# Patient Record
Sex: FEMALE | Race: OTHER | URBAN - METROPOLITAN AREA
[De-identification: names, ages, dates, MRNs, and addresses within clinical notes are randomized per-mention and may not be internally consistent; named-entity substitution may affect disease eponyms.]

---

## 2018-04-06 ENCOUNTER — HOSPITAL ENCOUNTER (OUTPATIENT)
Dept: OTHER | Age: 23
Discharge: HOME | End: 2018-04-06
Attending: PHYSICIAN ASSISTANT | Admitting: PHYSICIAN ASSISTANT

## 2018-04-06 LAB — GONADOTROPIN, CHORIONIC (HCG) QUANT: 266 MIU/ML

## 2018-04-11 LAB
ABO/RH: NORMAL
ANTIBODY SCREEN: NORMAL
GONADOTROPIN, CHORIONIC (HCG) QUANT: 1741 MIU/ML
HEPATITIS C ANTIBODY INTERPRETATION: NORMAL

## 2018-04-12 LAB
BASOPHILS ABSOLUTE: 0 K/UL (ref 0–0.2)
BASOPHILS RELATIVE PERCENT: 0.3 %
EOSINOPHILS ABSOLUTE: 0.1 K/UL (ref 0–0.6)
EOSINOPHILS RELATIVE PERCENT: 0.9 %
HCT VFR BLD CALC: 42.1 % (ref 36–48)
HEMOGLOBIN: 13.9 G/DL (ref 12–16)
HEPATITIS B SURFACE ANTIGEN INTERPRETATION: ABNORMAL
HIV AG/AB: NORMAL
LYMPHOCYTES ABSOLUTE: 3.2 K/UL (ref 1–5.1)
LYMPHOCYTES RELATIVE PERCENT: 28.8 %
MCH RBC QN AUTO: 28.1 PG (ref 26–34)
MCHC RBC AUTO-ENTMCNC: 33 G/DL (ref 31–36)
MCV RBC AUTO: 85.1 FL (ref 80–100)
MONOCYTES ABSOLUTE: 0.7 K/UL (ref 0–1.3)
MONOCYTES RELATIVE PERCENT: 6.5 %
NEUTROPHILS ABSOLUTE: 7 K/UL (ref 1.7–7.7)
NEUTROPHILS RELATIVE PERCENT: 63.5 %
PDW BLD-RTO: 12.8 % (ref 12.4–15.4)
PLATELET # BLD: 363 K/UL (ref 135–450)
PMV BLD AUTO: 9 FL (ref 5–10.5)
RBC # BLD: 4.95 M/UL (ref 4–5.2)
RPR: ABNORMAL
RUBELLA ANTIBODY IGG: 439.8 IU/ML
WBC # BLD: 11.1 K/UL (ref 4–11)

## 2018-04-17 LAB — GLUCOSE CHALLENGE: 193 MG/DL

## 2018-04-18 LAB
ESTIMATED AVERAGE GLUCOSE: 131.2 MG/DL
HBA1C MFR BLD: 6.2 %

## 2018-04-23 ENCOUNTER — HOSPITAL ENCOUNTER (OUTPATIENT)
Dept: OTHER | Age: 23
Discharge: HOME | End: 2018-04-23
Attending: OBSTETRICS & GYNECOLOGY | Admitting: OBSTETRICS & GYNECOLOGY

## 2018-05-01 ASSESSMENT — PATIENT HEALTH QUESTIONNAIRE - PHQ9
2. FEELING DOWN, DEPRESSED OR HOPELESS: 1
SUM OF ALL RESPONSES TO PHQ9 QUESTIONS 1 & 2: 2
1. LITTLE INTEREST OR PLEASURE IN DOING THINGS: 1
SUM OF ALL RESPONSES TO PHQ QUESTIONS 1-9: 2

## 2018-05-15 ENCOUNTER — HOSPITAL ENCOUNTER (OUTPATIENT)
Dept: DIABETES SERVICES | Age: 23
Discharge: HOME | End: 2018-05-31
Admitting: OBSTETRICS & GYNECOLOGY

## 2018-05-15 DIAGNOSIS — O99.810 ABNORMAL GLUCOSE COMPLICATING PREGNANCY: ICD-10-CM

## 2018-08-20 ENCOUNTER — HOSPITAL ENCOUNTER (OUTPATIENT)
Dept: OTHER | Age: 23
Discharge: HOME | End: 2018-08-20
Attending: OBSTETRICS & GYNECOLOGY | Admitting: OBSTETRICS & GYNECOLOGY

## 2018-08-20 LAB
HCT VFR BLD CALC: 37.4 % (ref 36–48)
HEMOGLOBIN: 12.4 G/DL (ref 12–16)
MCH RBC QN AUTO: 27.6 PG (ref 26–34)
MCHC RBC AUTO-ENTMCNC: 33.1 G/DL (ref 31–36)
MCV RBC AUTO: 83.2 FL (ref 80–100)
PDW BLD-RTO: 13.3 % (ref 12.4–15.4)
PLATELET # BLD: 298 K/UL (ref 135–450)
PMV BLD AUTO: 9.2 FL (ref 5–10.5)
RBC # BLD: 4.49 M/UL (ref 4–5.2)
WBC # BLD: 10.5 K/UL (ref 4–11)

## 2018-09-26 ENCOUNTER — APPOINTMENT (OUTPATIENT)
Dept: ULTRASOUND IMAGING | Age: 23
DRG: 781 | End: 2018-09-26
Payer: COMMERCIAL

## 2018-09-26 ENCOUNTER — APPOINTMENT (OUTPATIENT)
Dept: MRI IMAGING | Age: 23
DRG: 781 | End: 2018-09-26
Payer: COMMERCIAL

## 2018-09-26 ENCOUNTER — HOSPITAL ENCOUNTER (INPATIENT)
Age: 23
LOS: 1 days | Discharge: HOME | DRG: 781 | End: 2018-09-27
Attending: OBSTETRICS & GYNECOLOGY | Admitting: OBSTETRICS & GYNECOLOGY
Payer: COMMERCIAL

## 2018-09-26 PROBLEM — M79.18 ABDOMINAL MUSCLE PAIN: Status: ACTIVE | Noted: 2018-09-26

## 2018-09-26 LAB
BACTERIA: ABNORMAL /HPF
BASOPHILS ABSOLUTE: 0.1 K/UL (ref 0–0.2)
BASOPHILS RELATIVE PERCENT: 0.4 %
BILIRUBIN URINE: ABNORMAL
BLOOD, URINE: NEGATIVE
CLARITY: ABNORMAL
COLOR: ABNORMAL
EOSINOPHILS ABSOLUTE: 0 K/UL (ref 0–0.6)
EOSINOPHILS RELATIVE PERCENT: 0.1 %
EPITHELIAL CELLS, UA: 10 /HPF (ref 0–5)
GLUCOSE BLD-MCNC: 89 MG/DL (ref 70–99)
GLUCOSE URINE: NEGATIVE MG/DL
HCT VFR BLD CALC: 36.5 % (ref 36–48)
HEMOGLOBIN: 12.1 G/DL (ref 12–16)
KETONES, URINE: ABNORMAL MG/DL
LEUKOCYTE ESTERASE, URINE: ABNORMAL
LYMPHOCYTES ABSOLUTE: 1.5 K/UL (ref 1–5.1)
LYMPHOCYTES RELATIVE PERCENT: 8.7 %
MCH RBC QN AUTO: 26.5 PG (ref 26–34)
MCHC RBC AUTO-ENTMCNC: 33.3 G/DL (ref 31–36)
MCV RBC AUTO: 79.8 FL (ref 80–100)
MICROSCOPIC EXAMINATION: YES
MONOCYTES ABSOLUTE: 0.8 K/UL (ref 0–1.3)
MONOCYTES RELATIVE PERCENT: 4.9 %
NEUTROPHILS ABSOLUTE: 14.7 K/UL (ref 1.7–7.7)
NEUTROPHILS RELATIVE PERCENT: 85.9 %
NITRITE, URINE: NEGATIVE
PDW BLD-RTO: 13.5 % (ref 12.4–15.4)
PERFORMED ON: NORMAL
PH UA: 6
PLATELET # BLD: 296 K/UL (ref 135–450)
PMV BLD AUTO: 8.7 FL (ref 5–10.5)
PROTEIN UA: 30 MG/DL
RBC # BLD: 4.58 M/UL (ref 4–5.2)
RBC UA: 6 /HPF (ref 0–4)
SPECIFIC GRAVITY UA: 1.03
URINE TYPE: ABNORMAL
UROBILINOGEN, URINE: 1 E.U./DL
WBC # BLD: 17.1 K/UL (ref 4–11)
WBC UA: 11 /HPF (ref 0–5)

## 2018-09-26 PROCEDURE — 74181 MRI ABDOMEN W/O CONTRAST: CPT

## 2018-09-26 PROCEDURE — 85025 COMPLETE CBC W/AUTO DIFF WBC: CPT

## 2018-09-26 PROCEDURE — 81001 URINALYSIS AUTO W/SCOPE: CPT

## 2018-09-26 PROCEDURE — 99222 1ST HOSP IP/OBS MODERATE 55: CPT | Performed by: SURGERY

## 2018-09-26 PROCEDURE — APPSS60 APP SPLIT SHARED TIME 46-60 MINUTES: Performed by: NURSE PRACTITIONER

## 2018-09-26 PROCEDURE — 76805 OB US >/= 14 WKS SNGL FETUS: CPT

## 2018-09-26 PROCEDURE — 6370000000 HC RX 637 (ALT 250 FOR IP): Performed by: OBSTETRICS & GYNECOLOGY

## 2018-09-26 PROCEDURE — 76705 ECHO EXAM OF ABDOMEN: CPT

## 2018-09-26 PROCEDURE — 1220000000 HC SEMI PRIVATE OB R&B

## 2018-09-26 PROCEDURE — 2580000003 HC RX 258

## 2018-09-26 PROCEDURE — APPNB30 APP NON BILLABLE TIME 0-30 MINS: Performed by: NURSE PRACTITIONER

## 2018-09-26 RX ORDER — ACETAMINOPHEN 325 MG/1
650 TABLET ORAL ONCE
Status: COMPLETED | OUTPATIENT
Start: 2018-09-26 | End: 2018-09-26

## 2018-09-26 RX ORDER — SODIUM CHLORIDE 9 MG/ML
INJECTION, SOLUTION INTRAVENOUS CONTINUOUS
Status: DISCONTINUED | OUTPATIENT
Start: 2018-09-26 | End: 2018-09-27

## 2018-09-26 RX ORDER — ACETAMINOPHEN 325 MG/1
650 TABLET ORAL EVERY 4 HOURS PRN
Status: DISCONTINUED | OUTPATIENT
Start: 2018-09-26 | End: 2018-09-27 | Stop reason: HOSPADM

## 2018-09-26 RX ORDER — ACETAMINOPHEN AND CODEINE PHOSPHATE 300; 30 MG/1; MG/1
1 TABLET ORAL EVERY 4 HOURS PRN
Status: DISCONTINUED | OUTPATIENT
Start: 2018-09-26 | End: 2018-09-27 | Stop reason: HOSPADM

## 2018-09-26 RX ORDER — SODIUM CHLORIDE 9 MG/ML
INJECTION, SOLUTION INTRAVENOUS
Status: DISPENSED
Start: 2018-09-26 | End: 2018-09-27

## 2018-09-26 RX ADMIN — ACETAMINOPHEN AND CODEINE PHOSPHATE 1 TABLET: 300; 30 TABLET ORAL at 23:18

## 2018-09-26 RX ADMIN — ACETAMINOPHEN 650 MG: 325 TABLET, FILM COATED ORAL at 13:11

## 2018-09-26 ASSESSMENT — PAIN SCALES - GENERAL
PAINLEVEL_OUTOF10: 8
PAINLEVEL_OUTOF10: 8
PAINLEVEL_OUTOF10: 6

## 2018-09-26 ASSESSMENT — PAIN DESCRIPTION - FREQUENCY: FREQUENCY: INTERMITTENT

## 2018-09-26 ASSESSMENT — PAIN DESCRIPTION - DESCRIPTORS: DESCRIPTORS: SHARP

## 2018-09-26 ASSESSMENT — PAIN DESCRIPTION - LOCATION: LOCATION: ABDOMEN

## 2018-09-26 ASSESSMENT — PAIN DESCRIPTION - PAIN TYPE: TYPE: ACUTE PAIN

## 2018-09-26 ASSESSMENT — PAIN DESCRIPTION - ORIENTATION: ORIENTATION: RIGHT

## 2018-09-26 NOTE — PROGRESS NOTES
[facility]        Assessment & Plan of Care    Patient Name: Tyler Barros  MRN: [id number]  YOB: 1995  Admission Date: 9/26/2018  9:10 AM   Date of Evaluation: 9/26/2018  Primary Care Physician: Jeremy Cabrera MD  Reason for Consult: Cholelithiasis and RLQ abdominal pain    SUBJECTIVE    History of Present Illness:  Ms. Heather Morse is a 26 y.o. female who presents with right upper and right lower quadrant abdominal pain that started this morning upon waking. She describes it as severe, sharp, constant and reports that movement worsens the pain but has eased up some spontaneously since admission. Associated symptoms include mild cramping pain in the left lower quadrant, nausea, vomiting, and diarrhea 2 days ago. She reports that she experienced nausea/vomiting, along with diarrhea 2 days ago but no pain at that time, and her symptoms completely subsided yesterday. She denies fevers/chills, sweats, decrease in appetite, hematochezia/melena, hematemesis or bilious emesis, new foods, dysuria/hematuria, or similar symptoms in the past.  Her last BM was this morning and reportedly normal.  She has not had anything to eat today. Denies any prior surgeries. She is currently 29 weeks pregnant and has gestational diabetes. She does not take any blood thinners, smoke, or use illicit drugs or alcohol. No allergies. RUQ ultrasound today revealed cholelithiasis. Her WBC was 17,100 upon admission but she was afebrile.       OBJECTIVE    Physical Exam:  CONSTITUTIONAL:  alert, no apparent distress and moderately obese  NEUROLOGIC:  Mental Status Exam:  Level of Alertness:   awake  Orientation:   person, place, time  EYES:  sclera clear  ENT:  normocepalic, without obvious abnormality  NECK:  supple, symmetrical, trachea midline  LUNGS:  clear to auscultation  CARDIOVASCULAR:  regular rate and rhythm and no murmur noted  ABDOMEN: soft, non-distended (29

## 2018-09-26 NOTE — CONSULTS
no known allergies.     Social History:   Social History     Social History    Marital status: Single     Spouse name: N/A    Number of children: N/A    Years of education: N/A     Social History Main Topics    Smoking status: Never Smoker    Smokeless tobacco: Never Used    Alcohol use No    Drug use: No    Sexual activity: Yes     Partners: Male     Other Topics Concern    None     Social History Narrative    None       Family History:    Family History   Problem Relation Age of Onset    Diabetes Mother     Diabetes Father        Review of Systems:  CONSTITUTIONAL:  Negative except as above  HEENT:  negative  RESPIRATORY:  negative  CARDIOVASCULAR:  negative  GASTROINTESTINAL:  negative except as above   GENITOURINARY:  negative  HEMATOLOGIC/LYMPHATIC:  negative  NEUROLOGICAL:  Negative      Vital Signs:  Patient Vitals for the past 24 hrs:   BP Temp Temp src Pulse Resp SpO2 Height Weight   18 1730 114/71 98.2 °F (36.8 °C) Oral 83 24 98 % - -   18 1422 - - - - - - 5' 2\" (1.575 m) 202 lb (91.6 kg)   18 0925 - 97.4 °F (36.3 °C) Temporal - 22 - - -      TEMPERATURE HISTORY 24H: Temp (24hrs), Av.8 °F (36.6 °C), Min:97.4 °F (36.3 °C), Max:98.2 °F (36.8 °C)    BLOOD PRESSURE HISTORY: Systolic (18SNG), HAC:724 , Min:114 , LOD:357    Diastolic (71TYH), ZCS:34, Min:71, Max:71    Admission Weight: 202 lb (91.6 kg)       Intake/Output Summary (Last 24 hours) at 18 1813  Last data filed at 18 1806   Gross per 24 hour   Intake                0 ml   Output              125 ml   Net             -125 ml     Drain/tube Output:         Physical Exam:  CONSTITUTIONAL:  alert, no apparent distress   NEUROLOGIC:  Mental Status Exam:  Level of Alertness:   awake  Orientation:  Oriented to person, place, time  EYES:  sclera clear  HENT:  normocepalic, without obvious abnormality  NECK:  supple, symmetrical, trachea midline  LUNGS:  clear to auscultation  CARDIOVASCULAR:  regular rate and rhythm   ABDOMEN: soft, non-distended but appropriate gestational size, mild right upper quadrant tenderness to deep palpation without peritonitis  SKIN:  no bruising or bleeding, normal skin color, texture, turgor and no redness, warmth, or swelling      Labs:    CBC:    Recent Labs      09/26/18   1010   WBC  17.1*   HGB  12.1   HCT  36.5   PLT  296     BMP:  No results for input(s): NA, K, CL, CO2, BUN, CREATININE, GLUCOSE in the last 72 hours. Hepatic: No results for input(s): AST, ALT, ALB, BILITOT, ALKPHOS in the last 72 hours. Amylase: No results for input(s): AMYLASE in the last 72 hours. Lipase: No results for input(s): LIPASE in the last 72 hours. Mag:    No results for input(s): MG in the last 72 hours. Phos:   No results for input(s): PHOS in the last 72 hours. Coags: No results for input(s): INR, APTT in the last 72 hours. Imaging:  I have personally reviewed the following films:    Us Gallbladder Ruq    Result Date: 9/26/2018  EXAMINATION: RIGHT UPPER QUADRANT ULTRASOUND 9/26/2018 12:15 pm COMPARISON: None. HISTORY: ORDERING SYSTEM PROVIDED HISTORY: ABDOMINAL PAIN FINDINGS: LIVER:  The liver exhibits diffuse increased echogenicity consistent with fatty infiltration. There is focal fatty sparing noted within the region of the marixa hepatis. BILIARY SYSTEM:  The gallbladder contains numerous shadowing echogenic gallstones. There is no wall thickening or pericholecystic fluid. Common bile duct is within normal limits measuring 5 mm. RIGHT KIDNEY: The right kidney is grossly unremarkable without evidence of hydronephrosis. PANCREAS:  Visualized portions of the pancreas are unremarkable. OTHER: No evidence of right upper quadrant ascites. 1. Cholelithiasis. 2. Fatty infiltration of the liver with focal sparing. Xr Chest Portable    Result Date: 9/1/2018  EXAMINATION: SINGLE XRAY VIEW OF THE CHEST 9/1/2018 9:28 am COMPARISON: None.  HISTORY: ORDERING PHYSICIAN PROVIDED HISTORY: INGRID visualization. Velocities are measured in cm/s ; Diameters are measured in mm Right Lower Extremities DVT Study Measurements Right 2D Measurements +------------------------+----------+---------------+----------+ ! Location                ! Visualized! Compressibility! Thrombosis! +------------------------+----------+---------------+----------+ ! Sapheno Femoral Junction! Yes       ! Yes            ! None      ! +------------------------+----------+---------------+----------+ ! GSV Thigh               ! Yes       ! Yes            ! None      ! +------------------------+----------+---------------+----------+ ! Common Femoral          !Yes       ! Yes            ! None      ! +------------------------+----------+---------------+----------+ ! Prox Femoral            !Yes       ! Yes            ! None      ! +------------------------+----------+---------------+----------+ ! Mid Femoral             !Yes       ! Yes            ! None      ! +------------------------+----------+---------------+----------+ ! Dist Femoral            !Yes       ! Yes            ! None      ! +------------------------+----------+---------------+----------+ ! Deep Femoral            !Yes       ! Yes            ! None      ! +------------------------+----------+---------------+----------+ ! Popliteal               !Yes       ! Yes            ! None      ! +------------------------+----------+---------------+----------+ ! GSV Below Knee          ! Yes       ! Yes            ! None      ! +------------------------+----------+---------------+----------+ ! Gastroc                 ! Yes       ! Yes            ! None      ! +------------------------+----------+---------------+----------+ ! Soleal                  !Yes       ! Yes            ! None      ! +------------------------+----------+---------------+----------+ ! PTV                     ! Yes       ! Yes            ! None      ! +------------------------+----------+---------------+----------+ ! Tito                !Yes       ! Yes !Yes            !None      ! +------------------------+----------+---------------+----------+ ! Dist Femoral            !Yes       ! Yes            ! None      ! +------------------------+----------+---------------+----------+ ! Deep Femoral            !Yes       ! Yes            ! None      ! +------------------------+----------+---------------+----------+ ! Popliteal               !Yes       ! Yes            ! None      ! +------------------------+----------+---------------+----------+ ! GSV Below Knee          ! Yes       ! Yes            ! None      ! +------------------------+----------+---------------+----------+ ! Gastroc                 ! Yes       ! Yes            ! None      ! +------------------------+----------+---------------+----------+ ! Soleal                  !Yes       ! Yes            ! None      ! +------------------------+----------+---------------+----------+ ! PTV                     ! Yes       ! Yes            ! None      ! +------------------------+----------+---------------+----------+ ! Peroneal                !Yes       ! Yes            ! None      ! +------------------------+----------+---------------+----------+ ! GSV Calf                ! Yes       ! Yes            ! None      ! +------------------------+----------+---------------+----------+ ! SSV                     ! Yes       ! Yes            ! None      ! +------------------------+----------+---------------+----------+ Left Doppler Measurements +------------------------+-----------+------+------------+ ! Location                ! Signal     !Reflux! Reflux (sec)! +------------------------+-----------+------+------------+ ! Sapheno Femoral Junction! Phasic     !      !            ! +------------------------+-----------+------+------------+ ! Common Femoral          !Phasic     !      !            ! +------------------------+-----------+------+------------+ ! Prox Femoral            !Phasic     !      !            ! consider re-imaging  2. Although has cholelithiasis, doubt symptomatic biliary colic. Doubt cholecystitis but will monitor. She does have history of post prandial discomfort but may be gestational dyspepsia or biliary colic, but her current symptoms are clearly different than prior symptoms. 3. Right hydronephrosis of unclear etiology, if symptoms do not improve may need urology consultation for evaluation  4. Patient feels better and hungry, will trial clear liquids  5. Pain medication and antiemetics as needed as may mask worsening exam and cloud clinical picture  6. The patient is not currently smoking. Recommend maintaining a smoke-free lifestyle. 7. DVT prophylaxis with SCD's    This plan was discussed at length with the patient. She was understanding and in agreement with the plan  Thank you for the consult and allowing me to participate in the care of this patient. I look forward to following her this admission    Ovidio Shi MD, FACS  9/26/2018  6:13 PM

## 2018-09-27 VITALS
BODY MASS INDEX: 37.17 KG/M2 | DIASTOLIC BLOOD PRESSURE: 72 MMHG | HEIGHT: 62 IN | WEIGHT: 202 LBS | RESPIRATION RATE: 20 BRPM | HEART RATE: 77 BPM | TEMPERATURE: 98 F | OXYGEN SATURATION: 98 % | SYSTOLIC BLOOD PRESSURE: 109 MMHG

## 2018-09-27 LAB
ALBUMIN SERPL-MCNC: 3.1 G/DL (ref 3.4–5)
ALP BLD-CCNC: 115 U/L (ref 40–129)
ALT SERPL-CCNC: 13 U/L (ref 10–40)
ANION GAP SERPL CALCULATED.3IONS-SCNC: 13 MMOL/L (ref 3–16)
AST SERPL-CCNC: 14 U/L (ref 15–37)
BASOPHILS ABSOLUTE: 0 K/UL (ref 0–0.2)
BASOPHILS RELATIVE PERCENT: 0.5 %
BILIRUB SERPL-MCNC: 0.4 MG/DL (ref 0–1)
BILIRUBIN DIRECT: <0.2 MG/DL (ref 0–0.3)
BILIRUBIN, INDIRECT: ABNORMAL MG/DL (ref 0–1)
BUN BLDV-MCNC: 5 MG/DL (ref 7–20)
CALCIUM SERPL-MCNC: 9 MG/DL (ref 8.3–10.6)
CHLORIDE BLD-SCNC: 103 MMOL/L (ref 99–110)
CO2: 21 MMOL/L (ref 21–32)
CREAT SERPL-MCNC: <0.5 MG/DL (ref 0.6–1.1)
EOSINOPHILS ABSOLUTE: 0.1 K/UL (ref 0–0.6)
EOSINOPHILS RELATIVE PERCENT: 1.6 %
ESTIMATED AVERAGE GLUCOSE: 111.2 MG/DL
GFR AFRICAN AMERICAN: >60
GFR NON-AFRICAN AMERICAN: >60
GLUCOSE BLD-MCNC: 121 MG/DL (ref 70–99)
GLUCOSE BLD-MCNC: 94 MG/DL (ref 70–99)
HBA1C MFR BLD: 5.5 %
HCT VFR BLD CALC: 33.9 % (ref 36–48)
HEMOGLOBIN: 10.9 G/DL (ref 12–16)
LIPASE: 13 U/L (ref 13–60)
LYMPHOCYTES ABSOLUTE: 2.9 K/UL (ref 1–5.1)
LYMPHOCYTES RELATIVE PERCENT: 34.3 %
MCH RBC QN AUTO: 25.8 PG (ref 26–34)
MCHC RBC AUTO-ENTMCNC: 32.2 G/DL (ref 31–36)
MCV RBC AUTO: 80.2 FL (ref 80–100)
MONOCYTES ABSOLUTE: 0.7 K/UL (ref 0–1.3)
MONOCYTES RELATIVE PERCENT: 8.6 %
NEUTROPHILS ABSOLUTE: 4.7 K/UL (ref 1.7–7.7)
NEUTROPHILS RELATIVE PERCENT: 55 %
PDW BLD-RTO: 13.7 % (ref 12.4–15.4)
PERFORMED ON: ABNORMAL
PLATELET # BLD: 259 K/UL (ref 135–450)
PMV BLD AUTO: 8.3 FL (ref 5–10.5)
POTASSIUM SERPL-SCNC: 3.5 MMOL/L (ref 3.5–5.1)
RBC # BLD: 4.23 M/UL (ref 4–5.2)
SODIUM BLD-SCNC: 137 MMOL/L (ref 136–145)
TOTAL PROTEIN: 6.5 G/DL (ref 6.4–8.2)
WBC # BLD: 8.5 K/UL (ref 4–11)

## 2018-09-27 PROCEDURE — 83036 HEMOGLOBIN GLYCOSYLATED A1C: CPT

## 2018-09-27 PROCEDURE — 80048 BASIC METABOLIC PNL TOTAL CA: CPT

## 2018-09-27 PROCEDURE — 6360000002 HC RX W HCPCS: Performed by: OBSTETRICS & GYNECOLOGY

## 2018-09-27 PROCEDURE — 80076 HEPATIC FUNCTION PANEL: CPT

## 2018-09-27 PROCEDURE — APPNB30 APP NON BILLABLE TIME 0-30 MINS: Performed by: NURSE PRACTITIONER

## 2018-09-27 PROCEDURE — 2580000003 HC RX 258: Performed by: NURSE PRACTITIONER

## 2018-09-27 PROCEDURE — 85025 COMPLETE CBC W/AUTO DIFF WBC: CPT

## 2018-09-27 PROCEDURE — G0008 ADMIN INFLUENZA VIRUS VAC: HCPCS | Performed by: OBSTETRICS & GYNECOLOGY

## 2018-09-27 PROCEDURE — 83690 ASSAY OF LIPASE: CPT

## 2018-09-27 PROCEDURE — 99231 SBSQ HOSP IP/OBS SF/LOW 25: CPT | Performed by: SURGERY

## 2018-09-27 PROCEDURE — 90686 IIV4 VACC NO PRSV 0.5 ML IM: CPT | Performed by: OBSTETRICS & GYNECOLOGY

## 2018-09-27 PROCEDURE — APPSS15 APP SPLIT SHARED TIME 0-15 MINUTES: Performed by: NURSE PRACTITIONER

## 2018-09-27 RX ADMIN — INFLUENZA A VIRUS A/MICHIGAN/45/2015 X-275 (H1N1) ANTIGEN (FORMALDEHYDE INACTIVATED), INFLUENZA A VIRUS A/SINGAPORE/INFIMH-16-0019/2016 IVR-186 (H3N2) ANTIGEN (FORMALDEHYDE INACTIVATED), INFLUENZA B VIRUS B/PHUKET/3073/2013 ANTIGEN (FORMALDEHYDE INACTIVATED), AND INFLUENZA B VIRUS B/MARYLAND/15/2016 BX-69A ANTIGEN (FORMALDEHYDE INACTIVATED) 0.5 ML: 15; 15; 15; 15 INJECTION, SUSPENSION INTRAMUSCULAR at 13:20

## 2018-09-27 RX ADMIN — SODIUM CHLORIDE: 9 INJECTION, SOLUTION INTRAVENOUS at 03:47

## 2018-09-27 NOTE — PROGRESS NOTES
[facility]        Progress Note    Patient Name: Marty Zaidi  MRN: [id number]  YOB: 1995  Date of Evaluation: 2018    Chief Complaint: Abdominal pain    Subjective:  No acute events overnight  Sitting at bedside  Pain minimal this morning  No nausea or vomiting with clear liquids, wanting regular food      Vital Signs:  Patient Vitals for the past 24 hrs:   BP Temp Temp src Pulse Resp SpO2 Height Weight   18 0635 108/71 97.5 °F (36.4 °C) Oral 79 18 - - -   18 2330 104/69 97.5 °F (36.4 °C) Oral 87 20 - - -   18 1730 114/71 98.2 °F (36.8 °C) Oral 83 24 98 % - -   18 1422 - - - - - - 5' 2\" (1.575 m) 202 lb (91.6 kg)   18 0925 - 97.4 °F (36.3 °C) Temporal - 22 - - -      TEMPERATURE HISTORY 24H: Temp (24hrs), Av.7 °F (36.5 °C), Min:97.4 °F (36.3 °C), Max:98.2 °F (36.8 °C)    BLOOD PRESSURE HISTORY: Systolic (07ENW), MHM:803 , Min:104 , XSK:186    Diastolic (76BCB), ZPT:70, Min:69, Max:71      Intake/Output:  I/O last 3 completed shifts: In: 1004 [I.V.:1004]  Out: 575 [Urine:575]  I/O this shift: In: 400 [P.O.:400]  Out: -   Drain/tube Output:       Physical Exam:  General: awake, alert, oriented to  person, place, time  Lungs: RRR  Abdomen: soft, nontender, nondistended, no masses or organomegaly   SKIN:  no bruising or bleeding and normal skin color, texture, turgor      Labs:  CBC:    Recent Labs      18   1010  18   0640   WBC  17.1*  8.5   HGB  12.1  10.9*   HCT  36.5  33.9*   PLT  296  259     BMP:  Recent Labs      09/27/18   0640   NA  137   K  3.5   CL  103   CO2  21   BUN  5*   CREATININE  <0.5*   GLUCOSE  94     Hepatic: Recent Labs      18   0640   AST  14*   ALT  13   BILITOT  0.4   ALKPHOS  115     Amylase: No results for input(s): AMYLASE in the last 72 hours. Lipase:   Recent Labs      18   0640   LIPASE  13.0     Mag:    No results for input(s): MG in the last 72 hours. with SCD's  8. Management of medical comorbid etiologies per primary team and consulting services  9. Disposition: Okay for discharge home later if tolerating diet from a surgical perspective; follow up with Dr. Mia Mcgowan in the office after delivery to discuss an elective cholecystectomy    EDUCATION:  Educated patient on plan of care and disease process--all questions answered. Plans discussed with patient and nursing. Reviewed and discussed with Dr. Mia Mcgowan. Signed:  JJ Alejo - CNP  9/27/2018 9:23 AM    I have personally performed a face to face diagnostic evaluation on this patient. I have interviewed and examined the patient and I agree with the assessment above. In summary, my findings and plan are the following:   Ms. Vinay Lozoya is a 26 y.o. female who presents with   Cholelithiasis  Right hydronephrosis  Gestational diabetes  Pregnant, 29 weeks     Plan:  1. Clinical suspicion low for appendicitis. MRI negative although imaging can be normal within 24 hours. Pain is improving. If worsens, can consider re-imaging  2. Although has cholelithiasis, doubt symptomatic biliary colic. Doubt cholecystitis but will monitor. She does have history of post prandial discomfort but may be gestational dyspepsia or biliary colic, but her current symptoms are clearly different than prior symptoms. 3. Right hydronephrosis of unclear etiology, if symptoms do not improve may need urology consultation for evaluation  4. In general, patient much improved, leukocytosis resolved. Will advance diet and if tolerates can discharge from surgery standpoint. She can follow with me electively to discuss biliary colic should she develop episodes in the future and contact information provided    Ovidio Mcgowan MD, FACS  9/27/2018  10:48 AM

## 2018-11-12 ENCOUNTER — HOSPITAL ENCOUNTER (OUTPATIENT)
Age: 23
Discharge: HOME | End: 2018-11-12
Attending: OBSTETRICS & GYNECOLOGY | Admitting: OBSTETRICS & GYNECOLOGY
Payer: COMMERCIAL

## 2018-11-12 VITALS
TEMPERATURE: 97.3 F | RESPIRATION RATE: 18 BRPM | SYSTOLIC BLOOD PRESSURE: 122 MMHG | HEIGHT: 62 IN | HEART RATE: 85 BPM | DIASTOLIC BLOOD PRESSURE: 68 MMHG | BODY MASS INDEX: 37.91 KG/M2 | WEIGHT: 206 LBS

## 2018-11-12 PROBLEM — O47.9 THREATENED LABOR: Status: ACTIVE | Noted: 2018-11-12

## 2018-11-12 LAB
BACTERIA: ABNORMAL /HPF
BILIRUBIN URINE: ABNORMAL
BLOOD, URINE: NEGATIVE
CASTS 2: ABNORMAL /LPF
CASTS: ABNORMAL /LPF
CLARITY: ABNORMAL
COLOR: ABNORMAL
EPITHELIAL CELLS, UA: 16 /HPF (ref 0–5)
GLUCOSE URINE: NEGATIVE MG/DL
KETONES, URINE: 15 MG/DL
LEUKOCYTE ESTERASE, URINE: ABNORMAL
MICROSCOPIC EXAMINATION: YES
NITRITE, URINE: NEGATIVE
PH UA: 6
PROTEIN UA: 100 MG/DL
RBC UA: 4 /HPF (ref 0–4)
SPECIFIC GRAVITY UA: 1.03
URINE TYPE: ABNORMAL
UROBILINOGEN, URINE: 1 E.U./DL
WBC UA: 63 /HPF (ref 0–5)

## 2018-11-12 PROCEDURE — 59025 FETAL NON-STRESS TEST: CPT

## 2018-11-12 PROCEDURE — 99211 OFF/OP EST MAY X REQ PHY/QHP: CPT

## 2018-11-12 PROCEDURE — 87086 URINE CULTURE/COLONY COUNT: CPT

## 2018-11-12 PROCEDURE — 81001 URINALYSIS AUTO W/SCOPE: CPT

## 2018-11-12 NOTE — PROCEDURES
FETAL SURVEILLANCE TESTING SUMMARY    INDICATIONS:  eftal wellbeing    OBJECTIVE RESULTS:  Fetal heart variability: moderate  Fetal Heart Rate decelerations: none  Fetal Heart Rate accelerations: yes  Fetal Non-stress Test: reactive    Fetal surveillance: reassuring

## 2018-11-13 LAB — URINE CULTURE, ROUTINE: NORMAL

## 2018-11-15 LAB — GROUP B STREP VAGINAL CULTURE: NEGATIVE

## 2018-11-23 ENCOUNTER — HOSPITAL ENCOUNTER (OUTPATIENT)
Age: 23
Discharge: HOME | End: 2018-11-24
Attending: OBSTETRICS & GYNECOLOGY | Admitting: OBSTETRICS & GYNECOLOGY
Payer: COMMERCIAL

## 2018-11-23 LAB
BACTERIA: ABNORMAL /HPF
BILIRUBIN URINE: NEGATIVE
BLOOD, URINE: NEGATIVE
CLARITY: ABNORMAL
COLOR: YELLOW
EPITHELIAL CELLS, UA: 7 /HPF (ref 0–5)
GLUCOSE URINE: NEGATIVE MG/DL
HYALINE CASTS: 3 /LPF (ref 0–8)
KETONES, URINE: 15 MG/DL
LEUKOCYTE ESTERASE, URINE: ABNORMAL
MICROSCOPIC EXAMINATION: YES
NITRITE, URINE: NEGATIVE
PH UA: 6.5
PROTEIN UA: 100 MG/DL
RBC UA: 4 /HPF (ref 0–4)
SPECIFIC GRAVITY UA: 1.03
URINE TYPE: ABNORMAL
UROBILINOGEN, URINE: 1 E.U./DL
WBC UA: 7 /HPF (ref 0–5)

## 2018-11-23 PROCEDURE — 85027 COMPLETE CBC AUTOMATED: CPT

## 2018-11-23 PROCEDURE — 80076 HEPATIC FUNCTION PANEL: CPT

## 2018-11-23 PROCEDURE — 83690 ASSAY OF LIPASE: CPT

## 2018-11-23 PROCEDURE — 2580000003 HC RX 258

## 2018-11-23 PROCEDURE — 82150 ASSAY OF AMYLASE: CPT

## 2018-11-23 PROCEDURE — 81001 URINALYSIS AUTO W/SCOPE: CPT

## 2018-11-23 PROCEDURE — 36415 COLL VENOUS BLD VENIPUNCTURE: CPT

## 2018-11-23 PROCEDURE — 82947 ASSAY GLUCOSE BLOOD QUANT: CPT

## 2018-11-23 PROCEDURE — 80307 DRUG TEST PRSMV CHEM ANLYZR: CPT

## 2018-11-23 RX ORDER — SODIUM CHLORIDE, SODIUM LACTATE, POTASSIUM CHLORIDE, CALCIUM CHLORIDE 600; 310; 30; 20 MG/100ML; MG/100ML; MG/100ML; MG/100ML
INJECTION, SOLUTION INTRAVENOUS CONTINUOUS
Status: DISCONTINUED | OUTPATIENT
Start: 2018-11-24 | End: 2018-11-24 | Stop reason: HOSPADM

## 2018-11-23 RX ORDER — SODIUM CHLORIDE, SODIUM LACTATE, POTASSIUM CHLORIDE, CALCIUM CHLORIDE 600; 310; 30; 20 MG/100ML; MG/100ML; MG/100ML; MG/100ML
INJECTION, SOLUTION INTRAVENOUS
Status: DISPENSED
Start: 2018-11-23 | End: 2018-11-24

## 2018-11-23 RX ORDER — SODIUM CHLORIDE, SODIUM LACTATE, POTASSIUM CHLORIDE, CALCIUM CHLORIDE 600; 310; 30; 20 MG/100ML; MG/100ML; MG/100ML; MG/100ML
INJECTION, SOLUTION INTRAVENOUS
Status: COMPLETED
Start: 2018-11-23 | End: 2018-11-23

## 2018-11-23 RX ADMIN — SODIUM CHLORIDE, POTASSIUM CHLORIDE, SODIUM LACTATE AND CALCIUM CHLORIDE: 600; 310; 30; 20 INJECTION, SOLUTION INTRAVENOUS at 23:55

## 2018-11-24 VITALS
TEMPERATURE: 97.7 F | HEIGHT: 61 IN | SYSTOLIC BLOOD PRESSURE: 128 MMHG | WEIGHT: 201 LBS | DIASTOLIC BLOOD PRESSURE: 72 MMHG | BODY MASS INDEX: 37.95 KG/M2 | HEART RATE: 56 BPM | RESPIRATION RATE: 20 BRPM

## 2018-11-24 LAB
ALBUMIN SERPL-MCNC: 2.9 G/DL (ref 3.4–5)
ALBUMIN SERPL-MCNC: 3.2 G/DL (ref 3.4–5)
ALP BLD-CCNC: 192 U/L (ref 40–129)
ALP BLD-CCNC: 212 U/L (ref 40–129)
ALT SERPL-CCNC: 31 U/L (ref 10–40)
ALT SERPL-CCNC: 38 U/L (ref 10–40)
AMPHETAMINE SCREEN, URINE: NORMAL
AMYLASE: 36 U/L (ref 25–115)
AMYLASE: 49 U/L (ref 25–115)
AST SERPL-CCNC: 46 U/L (ref 15–37)
AST SERPL-CCNC: 69 U/L (ref 15–37)
BARBITURATE SCREEN URINE: NORMAL
BENZODIAZEPINE SCREEN, URINE: NORMAL
BILIRUB SERPL-MCNC: 0.5 MG/DL (ref 0–1)
BILIRUB SERPL-MCNC: 0.6 MG/DL (ref 0–1)
BILIRUBIN DIRECT: <0.2 MG/DL (ref 0–0.3)
BILIRUBIN DIRECT: <0.2 MG/DL (ref 0–0.3)
BILIRUBIN, INDIRECT: ABNORMAL MG/DL (ref 0–1)
BILIRUBIN, INDIRECT: ABNORMAL MG/DL (ref 0–1)
BUPRENORPHINE URINE: NORMAL
CANNABINOID SCREEN URINE: NORMAL
COCAINE METABOLITE SCREEN URINE: NORMAL
GLUCOSE BLD-MCNC: 108 MG/DL (ref 70–99)
GLUCOSE BLD-MCNC: 123 MG/DL (ref 70–99)
GLUCOSE BLD-MCNC: 88 MG/DL (ref 70–99)
HCT VFR BLD CALC: 35.6 % (ref 36–48)
HCT VFR BLD CALC: 39.5 % (ref 36–48)
HEMOGLOBIN: 11.5 G/DL (ref 12–16)
HEMOGLOBIN: 12.8 G/DL (ref 12–16)
LIPASE: 14 U/L (ref 13–60)
LIPASE: 24 U/L (ref 13–60)
Lab: NORMAL
MCH RBC QN AUTO: 24.6 PG (ref 26–34)
MCH RBC QN AUTO: 24.8 PG (ref 26–34)
MCHC RBC AUTO-ENTMCNC: 32.2 G/DL (ref 31–36)
MCHC RBC AUTO-ENTMCNC: 32.4 G/DL (ref 31–36)
MCV RBC AUTO: 76.3 FL (ref 80–100)
MCV RBC AUTO: 76.8 FL (ref 80–100)
METHADONE SCREEN, URINE: NORMAL
OPIATE SCREEN URINE: NORMAL
OXYCODONE URINE: NORMAL
PDW BLD-RTO: 15.1 % (ref 12.4–15.4)
PDW BLD-RTO: 15.3 % (ref 12.4–15.4)
PERFORMED ON: ABNORMAL
PERFORMED ON: NORMAL
PH UA: 6
PHENCYCLIDINE SCREEN URINE: NORMAL
PLATELET # BLD: 194 K/UL (ref 135–450)
PLATELET # BLD: 222 K/UL (ref 135–450)
PMV BLD AUTO: 9.2 FL (ref 5–10.5)
PMV BLD AUTO: 9.4 FL (ref 5–10.5)
PROPOXYPHENE SCREEN: NORMAL
RBC # BLD: 4.66 M/UL (ref 4–5.2)
RBC # BLD: 5.14 M/UL (ref 4–5.2)
TOTAL PROTEIN: 6.2 G/DL (ref 6.4–8.2)
TOTAL PROTEIN: 7.1 G/DL (ref 6.4–8.2)
WBC # BLD: 12.1 K/UL (ref 4–11)
WBC # BLD: 8.4 K/UL (ref 4–11)

## 2018-11-24 PROCEDURE — 6360000002 HC RX W HCPCS: Performed by: OBSTETRICS & GYNECOLOGY

## 2018-11-24 PROCEDURE — 80076 HEPATIC FUNCTION PANEL: CPT

## 2018-11-24 PROCEDURE — 82150 ASSAY OF AMYLASE: CPT

## 2018-11-24 PROCEDURE — 2580000003 HC RX 258: Performed by: OBSTETRICS & GYNECOLOGY

## 2018-11-24 PROCEDURE — 59025 FETAL NON-STRESS TEST: CPT

## 2018-11-24 PROCEDURE — 83690 ASSAY OF LIPASE: CPT

## 2018-11-24 PROCEDURE — 85027 COMPLETE CBC AUTOMATED: CPT

## 2018-11-24 RX ORDER — HYDROMORPHONE HCL 110MG/55ML
PATIENT CONTROLLED ANALGESIA SYRINGE INTRAVENOUS
Status: DISPENSED
Start: 2018-11-24 | End: 2018-11-24

## 2018-11-24 RX ORDER — ONDANSETRON 2 MG/ML
4 INJECTION INTRAMUSCULAR; INTRAVENOUS EVERY 6 HOURS PRN
Status: DISCONTINUED | OUTPATIENT
Start: 2018-11-24 | End: 2018-11-24 | Stop reason: HOSPADM

## 2018-11-24 RX ADMIN — HYDROMORPHONE HYDROCHLORIDE 0.5 MG: 1 INJECTION, SOLUTION INTRAMUSCULAR; INTRAVENOUS; SUBCUTANEOUS at 00:01

## 2018-11-24 RX ADMIN — ONDANSETRON 4 MG: 2 INJECTION INTRAMUSCULAR; INTRAVENOUS at 01:40

## 2018-11-24 RX ADMIN — SODIUM CHLORIDE, POTASSIUM CHLORIDE, SODIUM LACTATE AND CALCIUM CHLORIDE: 600; 310; 30; 20 INJECTION, SOLUTION INTRAVENOUS at 09:48

## 2018-11-24 ASSESSMENT — PAIN SCALES - GENERAL: PAINLEVEL_OUTOF10: 8

## 2018-11-24 NOTE — DISCHARGE SUMMARY
Obstetrical Discharge Form    Gestational Age: 37w4d    Antepartum complications: gestational diabetes, Cholecystitis      Hospital Course: Pt presented with complaint of RUQ pain, nausea. Admitted for observation. Pain essentially resolved HD#2. Labs improved. Discharge Medication:    Izabel Martinez, [facility] Medication Instructions [facility]:[id number]    Printed on:18 1000   Medication Information                      METFORMIN HCL PO  Take 500 mg by mouth daily              Prenatal Vit-Fe Fumarate-FA (PRENATAL VITAMIN) 28-0.8 MG TABS tablet  Take 1 tablet by mouth daily                  Discharge Condition:  good    Discharge Date: 18    PLAN:  Follow up next week for routine PP visit as scheduled  All questions answered  D/C summary begun at delivery for D/C planning purposes, any delay in discharge from ordered D/C date due to  factors.

## 2018-12-05 ENCOUNTER — ANESTHESIA (OUTPATIENT)
Dept: LABOR AND DELIVERY | Age: 23
End: 2018-12-05
Payer: COMMERCIAL

## 2018-12-05 ENCOUNTER — ANESTHESIA EVENT (OUTPATIENT)
Dept: LABOR AND DELIVERY | Age: 23
End: 2018-12-05
Payer: COMMERCIAL

## 2018-12-05 ENCOUNTER — HOSPITAL ENCOUNTER (INPATIENT)
Age: 23
LOS: 3 days | Discharge: HOME | End: 2018-12-08
Attending: OBSTETRICS & GYNECOLOGY | Admitting: OBSTETRICS & GYNECOLOGY
Payer: COMMERCIAL

## 2018-12-05 LAB
AMPHETAMINE SCREEN, URINE: NORMAL
BARBITURATE SCREEN URINE: NORMAL
BASOPHILS ABSOLUTE: 0 K/UL (ref 0–0.2)
BASOPHILS RELATIVE PERCENT: 0.4 %
BENZODIAZEPINE SCREEN, URINE: NORMAL
BUPRENORPHINE URINE: NORMAL
CANNABINOID SCREEN URINE: NORMAL
COCAINE METABOLITE SCREEN URINE: NORMAL
EOSINOPHILS ABSOLUTE: 0 K/UL (ref 0–0.6)
EOSINOPHILS RELATIVE PERCENT: 0.4 %
GLUCOSE BLD-MCNC: 72 MG/DL (ref 70–99)
GLUCOSE BLD-MCNC: 75 MG/DL (ref 70–99)
GLUCOSE BLD-MCNC: 76 MG/DL (ref 70–99)
GLUCOSE BLD-MCNC: 76 MG/DL (ref 70–99)
GLUCOSE BLD-MCNC: 78 MG/DL (ref 70–99)
HCT VFR BLD CALC: 37.7 % (ref 36–48)
HEMOGLOBIN: 12.6 G/DL (ref 12–16)
LYMPHOCYTES ABSOLUTE: 2.4 K/UL (ref 1–5.1)
LYMPHOCYTES RELATIVE PERCENT: 22.8 %
Lab: NORMAL
MCH RBC QN AUTO: 25.1 PG (ref 26–34)
MCHC RBC AUTO-ENTMCNC: 33.4 G/DL (ref 31–36)
MCV RBC AUTO: 75 FL (ref 80–100)
METHADONE SCREEN, URINE: NORMAL
MONOCYTES ABSOLUTE: 0.8 K/UL (ref 0–1.3)
MONOCYTES RELATIVE PERCENT: 7.7 %
NEUTROPHILS ABSOLUTE: 7.2 K/UL (ref 1.7–7.7)
NEUTROPHILS RELATIVE PERCENT: 68.7 %
OB URINE PROTEIN DIP ONLY: 100 MG/DL
OPIATE SCREEN URINE: NORMAL
OXYCODONE URINE: NORMAL
PDW BLD-RTO: 15.6 % (ref 12.4–15.4)
PERFORMED ON: NORMAL
PH UA: 7
PHENCYCLIDINE SCREEN URINE: NORMAL
PLATELET # BLD: 214 K/UL (ref 135–450)
PMV BLD AUTO: 9.8 FL (ref 5–10.5)
PROPOXYPHENE SCREEN: NORMAL
RBC # BLD: 5.02 M/UL (ref 4–5.2)
SPECIMEN STATUS: NORMAL
URIC ACID, SERUM: 4 MG/DL (ref 2.6–6)
WBC # BLD: 10.4 K/UL (ref 4–11)

## 2018-12-05 PROCEDURE — 6370000000 HC RX 637 (ALT 250 FOR IP): Performed by: OBSTETRICS & GYNECOLOGY

## 2018-12-05 PROCEDURE — 84550 ASSAY OF BLOOD/URIC ACID: CPT

## 2018-12-05 PROCEDURE — 4A1HXCZ MONITORING OF PRODUCTS OF CONCEPTION, CARDIAC RATE, EXTERNAL APPROACH: ICD-10-PCS | Performed by: OBSTETRICS & GYNECOLOGY

## 2018-12-05 PROCEDURE — 81003 URINALYSIS AUTO W/O SCOPE: CPT

## 2018-12-05 PROCEDURE — 82947 ASSAY GLUCOSE BLOOD QUANT: CPT

## 2018-12-05 PROCEDURE — 2580000003 HC RX 258

## 2018-12-05 PROCEDURE — 6360000002 HC RX W HCPCS: Performed by: OBSTETRICS & GYNECOLOGY

## 2018-12-05 PROCEDURE — 86780 TREPONEMA PALLIDUM: CPT

## 2018-12-05 PROCEDURE — 1220000000 HC SEMI PRIVATE OB R&B

## 2018-12-05 PROCEDURE — 80307 DRUG TEST PRSMV CHEM ANLYZR: CPT

## 2018-12-05 PROCEDURE — 2580000003 HC RX 258: Performed by: OBSTETRICS & GYNECOLOGY

## 2018-12-05 PROCEDURE — 85025 COMPLETE CBC W/AUTO DIFF WBC: CPT

## 2018-12-05 RX ORDER — DIPHENHYDRAMINE HYDROCHLORIDE 50 MG/ML
25 INJECTION INTRAMUSCULAR; INTRAVENOUS EVERY 4 HOURS PRN
Status: DISCONTINUED | OUTPATIENT
Start: 2018-12-05 | End: 2018-12-08 | Stop reason: HOSPADM

## 2018-12-05 RX ORDER — SODIUM CHLORIDE 0.9 % (FLUSH) 0.9 %
10 SYRINGE (ML) INJECTION EVERY 12 HOURS SCHEDULED
Status: DISCONTINUED | OUTPATIENT
Start: 2018-12-05 | End: 2018-12-06 | Stop reason: HOSPADM

## 2018-12-05 RX ORDER — CARBOPROST TROMETHAMINE 250 UG/ML
250 INJECTION, SOLUTION INTRAMUSCULAR PRN
Status: DISCONTINUED | OUTPATIENT
Start: 2018-12-05 | End: 2018-12-08 | Stop reason: HOSPADM

## 2018-12-05 RX ORDER — BUTORPHANOL TARTRATE 1 MG/ML
1 INJECTION, SOLUTION INTRAMUSCULAR; INTRAVENOUS
Status: DISCONTINUED | OUTPATIENT
Start: 2018-12-05 | End: 2018-12-05

## 2018-12-05 RX ORDER — MISOPROSTOL 100 UG/1
800 TABLET ORAL PRN
Status: DISCONTINUED | OUTPATIENT
Start: 2018-12-05 | End: 2018-12-06 | Stop reason: HOSPADM

## 2018-12-05 RX ORDER — SODIUM CHLORIDE 9 MG/ML
INJECTION, SOLUTION INTRAVENOUS
Status: COMPLETED
Start: 2018-12-05 | End: 2018-12-05

## 2018-12-05 RX ORDER — TERBUTALINE SULFATE 1 MG/ML
0.25 INJECTION, SOLUTION SUBCUTANEOUS ONCE
Status: DISCONTINUED | OUTPATIENT
Start: 2018-12-05 | End: 2018-12-08 | Stop reason: HOSPADM

## 2018-12-05 RX ORDER — SODIUM CHLORIDE, SODIUM LACTATE, POTASSIUM CHLORIDE, CALCIUM CHLORIDE 600; 310; 30; 20 MG/100ML; MG/100ML; MG/100ML; MG/100ML
INJECTION, SOLUTION INTRAVENOUS CONTINUOUS
Status: DISCONTINUED | OUTPATIENT
Start: 2018-12-05 | End: 2018-12-08 | Stop reason: HOSPADM

## 2018-12-05 RX ORDER — LIDOCAINE HYDROCHLORIDE 10 MG/ML
30 INJECTION, SOLUTION EPIDURAL; INFILTRATION; INTRACAUDAL; PERINEURAL PRN
Status: DISCONTINUED | OUTPATIENT
Start: 2018-12-05 | End: 2018-12-06 | Stop reason: HOSPADM

## 2018-12-05 RX ORDER — ACETAMINOPHEN 325 MG/1
650 TABLET ORAL EVERY 4 HOURS PRN
Status: DISCONTINUED | OUTPATIENT
Start: 2018-12-05 | End: 2018-12-06 | Stop reason: HOSPADM

## 2018-12-05 RX ORDER — SODIUM CHLORIDE 0.9 % (FLUSH) 0.9 %
10 SYRINGE (ML) INJECTION PRN
Status: DISCONTINUED | OUTPATIENT
Start: 2018-12-05 | End: 2018-12-06 | Stop reason: HOSPADM

## 2018-12-05 RX ORDER — METHYLERGONOVINE MALEATE 0.2 MG/ML
200 INJECTION INTRAVENOUS PRN
Status: DISCONTINUED | OUTPATIENT
Start: 2018-12-05 | End: 2018-12-08 | Stop reason: HOSPADM

## 2018-12-05 RX ORDER — HYDROXYZINE PAMOATE 25 MG/1
50 CAPSULE ORAL ONCE
Status: COMPLETED | OUTPATIENT
Start: 2018-12-05 | End: 2018-12-05

## 2018-12-05 RX ORDER — ONDANSETRON 2 MG/ML
4 INJECTION INTRAMUSCULAR; INTRAVENOUS EVERY 6 HOURS PRN
Status: DISCONTINUED | OUTPATIENT
Start: 2018-12-05 | End: 2018-12-06 | Stop reason: HOSPADM

## 2018-12-05 RX ORDER — VALACYCLOVIR HYDROCHLORIDE 500 MG/1
500 TABLET, FILM COATED ORAL DAILY
Refills: 5 | COMMUNITY
Start: 2018-12-03

## 2018-12-05 RX ADMIN — Medication 1 MILLI-UNITS/MIN: at 21:11

## 2018-12-05 RX ADMIN — SODIUM CHLORIDE, POTASSIUM CHLORIDE, SODIUM LACTATE AND CALCIUM CHLORIDE: 600; 310; 30; 20 INJECTION, SOLUTION INTRAVENOUS at 19:03

## 2018-12-05 RX ADMIN — HYDROXYZINE PAMOATE 50 MG: 25 CAPSULE ORAL at 21:48

## 2018-12-05 RX ADMIN — BUTORPHANOL TARTRATE 2 MG: 2 INJECTION, SOLUTION INTRAMUSCULAR; INTRAVENOUS at 21:20

## 2018-12-05 RX ADMIN — SODIUM CHLORIDE 1000 ML: 9 INJECTION, SOLUTION INTRAVENOUS at 13:45

## 2018-12-05 ASSESSMENT — LIFESTYLE VARIABLES: SMOKING_STATUS: 0

## 2018-12-05 ASSESSMENT — PAIN SCALES - GENERAL: PAINLEVEL_OUTOF10: 7

## 2018-12-05 NOTE — ANESTHESIA PRE PROCEDURE
12/05/2018    RDW 15.6 12/05/2018     12/05/2018       CMP:   Lab Results   Component Value Date     09/27/2018    K 3.5 09/27/2018    K 3.8 09/01/2018     09/27/2018    CO2 21 09/27/2018    BUN 5 09/27/2018    CREATININE <0.5 09/27/2018    GFRAA >60 09/27/2018    AGRATIO 1.0 09/01/2018    LABGLOM >60 09/27/2018    GLUCOSE 75 12/05/2018    PROT 6.2 11/24/2018    CALCIUM 9.0 09/27/2018    BILITOT 0.5 11/24/2018    ALKPHOS 192 11/24/2018    AST 46 11/24/2018    ALT 38 11/24/2018       POC Tests: No results for input(s): POCGLU, POCNA, POCK, POCCL, POCBUN, POCHEMO, POCHCT in the last 72 hours. Coags:   Lab Results   Component Value Date    PROTIME 10.3 09/01/2018    INR 0.90 09/01/2018    APTT 30.6 09/01/2018       HCG (If Applicable): No results found for: PREGTESTUR, PREGSERUM, HCG, HCGQUANT     ABGs: No results found for: PHART, PO2ART, HTO3ORY, QKW1PAG, BEART, R1CZPVVK     Type & Screen (If Applicable):  No results found for: LABABO, [address]    Anesthesia Evaluation  Patient summary reviewed and Nursing notes reviewed no history of anesthetic complications:   Airway: Mallampati: IV  TM distance: >3 FB   Neck ROM: full  Mouth opening: < 3 FB Dental: normal exam         Pulmonary:Negative Pulmonary ROS and normal exam        (-) not a current smoker                           Cardiovascular:Negative CV ROS             Beta Blocker:  Not on Beta Blocker         Neuro/Psych:   Negative Neuro/Psych ROS              GI/Hepatic/Renal:   (+) GERD: poorly controlled,           Endo/Other:    (+) DiabetesType II DM, well controlled, , .                  ROS comment: Gestational diabetes controlled with metformin Abdominal:           Vascular: negative vascular ROS.                                        Anesthesia Plan      epidural     ASA 2     (Height: 5' 1\" (1.549 m), Weight: 208 lb (94.3 kg), BP: 139/78  Lab Results       Component                Value               Date                       WBC

## 2018-12-06 LAB
GLUCOSE BLD-MCNC: 63 MG/DL (ref 70–99)
GLUCOSE BLD-MCNC: 64 MG/DL (ref 70–99)
GLUCOSE BLD-MCNC: 65 MG/DL (ref 70–99)
GLUCOSE BLD-MCNC: 73 MG/DL (ref 70–99)
GLUCOSE BLD-MCNC: 76 MG/DL (ref 70–99)
GLUCOSE BLD-MCNC: 77 MG/DL (ref 70–99)
GLUCOSE BLD-MCNC: 82 MG/DL (ref 70–99)
PERFORMED ON: ABNORMAL
PERFORMED ON: NORMAL
TOTAL SYPHILLIS IGG/IGM: NORMAL

## 2018-12-06 PROCEDURE — 3700000025 ANESTHESIA EPIDURAL BLOCK: Performed by: ANESTHESIOLOGY

## 2018-12-06 PROCEDURE — 2500000003 HC RX 250 WO HCPCS: Performed by: NURSE ANESTHETIST, CERTIFIED REGISTERED

## 2018-12-06 PROCEDURE — 6370000000 HC RX 637 (ALT 250 FOR IP): Performed by: OBSTETRICS & GYNECOLOGY

## 2018-12-06 PROCEDURE — 2580000003 HC RX 258: Performed by: OBSTETRICS & GYNECOLOGY

## 2018-12-06 PROCEDURE — 7200000001 HC VAGINAL DELIVERY

## 2018-12-06 PROCEDURE — 51702 INSERT TEMP BLADDER CATH: CPT

## 2018-12-06 PROCEDURE — 1200000000 HC SEMI PRIVATE

## 2018-12-06 PROCEDURE — 6360000002 HC RX W HCPCS: Performed by: OBSTETRICS & GYNECOLOGY

## 2018-12-06 RX ORDER — IBUPROFEN 600 MG/1
600 TABLET ORAL EVERY 6 HOURS PRN
Status: DISCONTINUED | OUTPATIENT
Start: 2018-12-06 | End: 2018-12-08 | Stop reason: HOSPADM

## 2018-12-06 RX ORDER — LIDOCAINE HYDROCHLORIDE 10 MG/ML
INJECTION, SOLUTION INFILTRATION; PERINEURAL PRN
Status: DISCONTINUED | OUTPATIENT
Start: 2018-12-06 | End: 2018-12-06 | Stop reason: SDUPTHER

## 2018-12-06 RX ORDER — BUPIVACAINE HYDROCHLORIDE 2.5 MG/ML
INJECTION, SOLUTION EPIDURAL; INFILTRATION; INTRACAUDAL PRN
Status: DISCONTINUED | OUTPATIENT
Start: 2018-12-06 | End: 2018-12-06 | Stop reason: SDUPTHER

## 2018-12-06 RX ORDER — CEFAZOLIN SODIUM 2 G/100ML
INJECTION, SOLUTION INTRAVENOUS
Status: DISCONTINUED
Start: 2018-12-06 | End: 2018-12-06 | Stop reason: WASHOUT

## 2018-12-06 RX ORDER — METOCLOPRAMIDE HYDROCHLORIDE 5 MG/ML
INJECTION INTRAMUSCULAR; INTRAVENOUS
Status: DISCONTINUED
Start: 2018-12-06 | End: 2018-12-06 | Stop reason: WASHOUT

## 2018-12-06 RX ORDER — LIDOCAINE HYDROCHLORIDE AND EPINEPHRINE BITARTRATE 20; .01 MG/ML; MG/ML
INJECTION, SOLUTION SUBCUTANEOUS PRN
Status: DISCONTINUED | OUTPATIENT
Start: 2018-12-06 | End: 2018-12-06 | Stop reason: SDUPTHER

## 2018-12-06 RX ORDER — ACETAMINOPHEN 325 MG/1
650 TABLET ORAL EVERY 6 HOURS PRN
Status: DISCONTINUED | OUTPATIENT
Start: 2018-12-06 | End: 2018-12-08 | Stop reason: HOSPADM

## 2018-12-06 RX ORDER — TRISODIUM CITRATE DIHYDRATE AND CITRIC ACID MONOHYDRATE 500; 334 MG/5ML; MG/5ML
SOLUTION ORAL
Status: DISCONTINUED
Start: 2018-12-06 | End: 2018-12-06 | Stop reason: WASHOUT

## 2018-12-06 RX ADMIN — SODIUM CHLORIDE, POTASSIUM CHLORIDE, SODIUM LACTATE AND CALCIUM CHLORIDE: 600; 310; 30; 20 INJECTION, SOLUTION INTRAVENOUS at 10:31

## 2018-12-06 RX ADMIN — LIDOCAINE HYDROCHLORIDE 3 ML: 10 INJECTION, SOLUTION INFILTRATION; PERINEURAL at 03:46

## 2018-12-06 RX ADMIN — Medication 1 MILLI-UNITS/MIN: at 16:42

## 2018-12-06 RX ADMIN — ACETAMINOPHEN 650 MG: 325 TABLET, FILM COATED ORAL at 21:58

## 2018-12-06 RX ADMIN — BUTORPHANOL TARTRATE 2 MG: 2 INJECTION, SOLUTION INTRAMUSCULAR; INTRAVENOUS at 00:29

## 2018-12-06 RX ADMIN — ACETAMINOPHEN 650 MG: 325 TABLET, FILM COATED ORAL at 13:52

## 2018-12-06 RX ADMIN — SODIUM CHLORIDE, POTASSIUM CHLORIDE, SODIUM LACTATE AND CALCIUM CHLORIDE: 600; 310; 30; 20 INJECTION, SOLUTION INTRAVENOUS at 02:57

## 2018-12-06 RX ADMIN — BUPIVACAINE HYDROCHLORIDE 5 ML: 2.5 INJECTION, SOLUTION EPIDURAL; INFILTRATION; INTRACAUDAL; PERINEURAL at 03:50

## 2018-12-06 RX ADMIN — Medication 12 ML/HR: at 03:53

## 2018-12-06 RX ADMIN — LIDOCAINE HYDROCHLORIDE AND EPINEPHRINE 3 ML: 20; 10 INJECTION, SOLUTION INFILTRATION; PERINEURAL at 03:47

## 2018-12-06 RX ADMIN — IBUPROFEN 600 MG: 600 TABLET ORAL at 21:58

## 2018-12-06 RX ADMIN — AMPICILLIN SODIUM 2 G: 2 INJECTION, POWDER, FOR SOLUTION INTRAMUSCULAR; INTRAVENOUS at 17:33

## 2018-12-06 RX ADMIN — GENTAMICIN SULFATE 140 MG: 40 INJECTION, SOLUTION INTRAMUSCULAR; INTRAVENOUS at 18:37

## 2018-12-06 ASSESSMENT — PAIN SCALES - GENERAL
PAINLEVEL_OUTOF10: 3
PAINLEVEL_OUTOF10: 8
PAINLEVEL_OUTOF10: 1

## 2018-12-06 NOTE — ANESTHESIA PROCEDURE NOTES
or paresthesia. Spinal needle removed. Threaded epidural catheter through Tuohy needle easily. Tuohy needle withdrawn. Test Dose:  0347         Negative aspiration. 3cc of 1.5% Lido with epi 1:200,000 test dose given. Negative test dose. Skin: 12cm catheter taped at the skin. Secured with steri strips, tegaderm, and tape. Infusion:  .15% Ropivacaine with Fentanyl (2ug/cc)  Auto bolus 4 ml every 20 minutes. (Max. Dose- 40 ml/hr.)      Sensory Level:  R:  t10 L: t10    VAS: start 8/10, end 0/10    Patient in supine position with left uterine displacement.  VSS per rn

## 2018-12-07 PROCEDURE — 1200000000 HC SEMI PRIVATE

## 2018-12-07 PROCEDURE — 6370000000 HC RX 637 (ALT 250 FOR IP): Performed by: OBSTETRICS & GYNECOLOGY

## 2018-12-07 PROCEDURE — 6360000002 HC RX W HCPCS: Performed by: OBSTETRICS & GYNECOLOGY

## 2018-12-07 PROCEDURE — 2580000003 HC RX 258: Performed by: OBSTETRICS & GYNECOLOGY

## 2018-12-07 RX ORDER — DOCUSATE SODIUM 100 MG/1
100 CAPSULE, LIQUID FILLED ORAL DAILY
Status: DISCONTINUED | OUTPATIENT
Start: 2018-12-07 | End: 2018-12-08 | Stop reason: HOSPADM

## 2018-12-07 RX ORDER — POLYETHYLENE GLYCOL 3350 17 G/17G
17 POWDER, FOR SOLUTION ORAL DAILY
Status: DISCONTINUED | OUTPATIENT
Start: 2018-12-07 | End: 2018-12-08 | Stop reason: HOSPADM

## 2018-12-07 RX ADMIN — IBUPROFEN 600 MG: 600 TABLET ORAL at 10:04

## 2018-12-07 RX ADMIN — AMPICILLIN SODIUM 2 G: 2 INJECTION, POWDER, FOR SOLUTION INTRAMUSCULAR; INTRAVENOUS at 09:09

## 2018-12-07 RX ADMIN — ACETAMINOPHEN 650 MG: 325 TABLET, FILM COATED ORAL at 14:10

## 2018-12-07 RX ADMIN — DOCUSATE SODIUM 100 MG: 100 CAPSULE, LIQUID FILLED ORAL at 19:47

## 2018-12-07 RX ADMIN — ACETAMINOPHEN 650 MG: 325 TABLET, FILM COATED ORAL at 04:09

## 2018-12-07 RX ADMIN — AMPICILLIN SODIUM 2 G: 2 INJECTION, POWDER, FOR SOLUTION INTRAMUSCULAR; INTRAVENOUS at 15:12

## 2018-12-07 RX ADMIN — AMPICILLIN SODIUM 2 G: 2 INJECTION, POWDER, FOR SOLUTION INTRAMUSCULAR; INTRAVENOUS at 02:59

## 2018-12-07 RX ADMIN — GENTAMICIN SULFATE 140 MG: 40 INJECTION, SOLUTION INTRAMUSCULAR; INTRAVENOUS at 04:08

## 2018-12-07 RX ADMIN — SODIUM CHLORIDE, POTASSIUM CHLORIDE, SODIUM LACTATE AND CALCIUM CHLORIDE: 600; 310; 30; 20 INJECTION, SOLUTION INTRAVENOUS at 12:56

## 2018-12-07 RX ADMIN — DOCUSATE SODIUM 100 MG: 100 CAPSULE, LIQUID FILLED ORAL at 11:30

## 2018-12-07 RX ADMIN — IBUPROFEN 600 MG: 600 TABLET ORAL at 19:47

## 2018-12-07 RX ADMIN — POLYETHYLENE GLYCOL 3350 17 G: 17 POWDER, FOR SOLUTION ORAL at 11:30

## 2018-12-07 RX ADMIN — GENTAMICIN SULFATE 140 MG: 40 INJECTION, SOLUTION INTRAMUSCULAR; INTRAVENOUS at 12:56

## 2018-12-07 RX ADMIN — SODIUM CHLORIDE, POTASSIUM CHLORIDE, SODIUM LACTATE AND CALCIUM CHLORIDE: 600; 310; 30; 20 INJECTION, SOLUTION INTRAVENOUS at 04:50

## 2018-12-07 ASSESSMENT — PAIN SCALES - GENERAL
PAINLEVEL_OUTOF10: 2
PAINLEVEL_OUTOF10: 4
PAINLEVEL_OUTOF10: 2
PAINLEVEL_OUTOF10: 0

## 2018-12-07 NOTE — FLOWSHEET NOTE
MOB states that she does not have a pediatrician because she wants the infant to be put on Medicaid, while pt is on private insurance through her work. I left a message for financial to speak with her and provided a list of peds that accept Medicaid. She is starting to call to try to find a ped who will accept her infant.

## 2018-12-07 NOTE — DISCHARGE SUMMARY
Obstetrical Discharge Form    Gestational Age: 39w2d    Antepartum complications: Obesity, suspected LGA    Date of Delivery: 2018      Type of Delivery: vacuum extraction    Delivered By: Deric Juarez     Baby:      Information for the patient's :  Lencho Ramos [[id number]]   APGAR One: 9    Information for the patient's :  Meme Prater [[id number]]   APGAR Five: 9    Information for the patient's :  Lencho Ramos [[id number]]   Birth Weight: 8 lb 2.7 oz (3.705 kg)      Anesthesia: Epidural    Intrapartum complications: Chorio    Postpartum complications: none    Discharge Medication:    Stefania Hernández   Home Medication Instructions [id number]    Printed on:18 6939   Medication Information                      METFORMIN HCL PO  Take 500 mg by mouth daily              Prenatal Vit-Fe Fumarate-FA (PRENATAL VITAMIN) 28-0.8 MG TABS tablet  Take 1 tablet by mouth daily             valACYclovir (VALTREX) 500 MG tablet  Take 500 mg by mouth daily                  Discharge Condition:  good    Discharge Date: 18    PLAN:  Follow up in 6 weeks for routine PP visit  All questions answered  D/C summary begun at delivery for D/C planning purposes, any delay in discharge from ordered D/C date due to  factors.

## 2018-12-08 VITALS
TEMPERATURE: 97 F | RESPIRATION RATE: 16 BRPM | HEART RATE: 79 BPM | HEIGHT: 61 IN | BODY MASS INDEX: 39.27 KG/M2 | SYSTOLIC BLOOD PRESSURE: 120 MMHG | WEIGHT: 208 LBS | OXYGEN SATURATION: 96 % | DIASTOLIC BLOOD PRESSURE: 77 MMHG

## 2018-12-08 PROCEDURE — 6370000000 HC RX 637 (ALT 250 FOR IP): Performed by: OBSTETRICS & GYNECOLOGY

## 2018-12-08 PROCEDURE — 6360000002 HC RX W HCPCS: Performed by: OBSTETRICS & GYNECOLOGY

## 2018-12-08 PROCEDURE — 90471 IMMUNIZATION ADMIN: CPT | Performed by: OBSTETRICS & GYNECOLOGY

## 2018-12-08 PROCEDURE — 90715 TDAP VACCINE 7 YRS/> IM: CPT | Performed by: OBSTETRICS & GYNECOLOGY

## 2018-12-08 RX ORDER — OXYCODONE HYDROCHLORIDE AND ACETAMINOPHEN 5; 325 MG/1; MG/1
1-2 TABLET ORAL EVERY 4 HOURS PRN
Qty: 15 TABLET | Refills: 0 | Status: SHIPPED | OUTPATIENT
Start: 2018-12-08 | End: 2019-12-08

## 2018-12-08 RX ORDER — IBUPROFEN 800 MG/1
800 TABLET ORAL EVERY 6 HOURS PRN
Qty: 40 TABLET | Refills: 0 | Status: SHIPPED | OUTPATIENT
Start: 2018-12-08

## 2018-12-08 RX ADMIN — DOCUSATE SODIUM 100 MG: 100 CAPSULE, LIQUID FILLED ORAL at 08:16

## 2018-12-08 RX ADMIN — ACETAMINOPHEN 650 MG: 325 TABLET, FILM COATED ORAL at 15:40

## 2018-12-08 RX ADMIN — IBUPROFEN 600 MG: 600 TABLET ORAL at 08:16

## 2018-12-08 RX ADMIN — ACETAMINOPHEN 650 MG: 325 TABLET, FILM COATED ORAL at 08:16

## 2018-12-08 RX ADMIN — IBUPROFEN 600 MG: 600 TABLET ORAL at 01:45

## 2018-12-08 RX ADMIN — IBUPROFEN 600 MG: 600 TABLET ORAL at 15:40

## 2018-12-08 RX ADMIN — TETANUS TOXOID, REDUCED DIPHTHERIA TOXOID AND ACELLULAR PERTUSSIS VACCINE, ADSORBED 0.5 ML: 5; 2.5; 8; 8; 2.5 SUSPENSION INTRAMUSCULAR at 00:42

## 2018-12-08 ASSESSMENT — PAIN SCALES - GENERAL
PAINLEVEL_OUTOF10: 1
PAINLEVEL_OUTOF10: 3
PAINLEVEL_OUTOF10: 3

## 2018-12-08 NOTE — FLOWSHEET NOTE
MD informed Pt has been afebrile for over 24 hours now.  Order received to discontinue Amp and Gent and HL Pt.

## 2018-12-08 NOTE — PROGRESS NOTES
Postpartum care teaching completed and forms signed by patient. Copy witnessed by RN and given to patient. Patient verbalized understanding of all teaching points. Prescriptions given. Patient plans to follow-up with OB Provider in 6 weeks as instructed. Patient verbalizes understanding of discharge instructions and denies further questions. Patient discharged in stable condition, rooming in with infant until infant discharge.
units in 500 mL infusion, 1 jonathan-units/min, Intravenous, Continuous, Ayah Johns MD, Last Rate: 9 mL/hr at 12/06/18 0610, 9 jonathan-units/min at 12/06/18 0610     Assessment/Plan:      Post Partum: Continue Postpartum care  Home today